# Patient Record
Sex: FEMALE | Race: WHITE | Employment: PART TIME | URBAN - METROPOLITAN AREA
[De-identification: names, ages, dates, MRNs, and addresses within clinical notes are randomized per-mention and may not be internally consistent; named-entity substitution may affect disease eponyms.]

---

## 2020-01-13 ENCOUNTER — HOSPITAL ENCOUNTER (INPATIENT)
Age: 40
LOS: 1 days | Discharge: PSYCHIATRIC OR SUBSTANCE USE FACILITY | DRG: 917 | End: 2020-01-15
Attending: EMERGENCY MEDICINE | Admitting: INTERNAL MEDICINE

## 2020-01-13 LAB
A/G RATIO: 2.1 (ref 1.1–2.2)
ACETAMINOPHEN LEVEL: <5 UG/ML (ref 10–30)
ALBUMIN SERPL-MCNC: 4.8 G/DL (ref 3.4–5)
ALP BLD-CCNC: 53 U/L (ref 40–129)
ALT SERPL-CCNC: 10 U/L (ref 10–40)
ANION GAP SERPL CALCULATED.3IONS-SCNC: 13 MMOL/L (ref 3–16)
AST SERPL-CCNC: 15 U/L (ref 15–37)
BASOPHILS ABSOLUTE: 0 K/UL (ref 0–0.2)
BASOPHILS RELATIVE PERCENT: 0.6 %
BILIRUB SERPL-MCNC: 0.3 MG/DL (ref 0–1)
BUN BLDV-MCNC: 10 MG/DL (ref 7–20)
CALCIUM SERPL-MCNC: 9.4 MG/DL (ref 8.3–10.6)
CHLORIDE BLD-SCNC: 102 MMOL/L (ref 99–110)
CO2: 24 MMOL/L (ref 21–32)
CREAT SERPL-MCNC: 0.6 MG/DL (ref 0.6–1.1)
EOSINOPHILS ABSOLUTE: 0 K/UL (ref 0–0.6)
EOSINOPHILS RELATIVE PERCENT: 0.8 %
ETHANOL: NORMAL MG/DL (ref 0–0.08)
GFR AFRICAN AMERICAN: >60
GFR NON-AFRICAN AMERICAN: >60
GLOBULIN: 2.3 G/DL
GLUCOSE BLD-MCNC: 123 MG/DL (ref 70–99)
HCT VFR BLD CALC: 42.7 % (ref 36–48)
HEMOGLOBIN: 14.3 G/DL (ref 12–16)
LYMPHOCYTES ABSOLUTE: 1.7 K/UL (ref 1–5.1)
LYMPHOCYTES RELATIVE PERCENT: 29 %
MAGNESIUM: 2.3 MG/DL (ref 1.8–2.4)
MCH RBC QN AUTO: 32.8 PG (ref 26–34)
MCHC RBC AUTO-ENTMCNC: 33.6 G/DL (ref 31–36)
MCV RBC AUTO: 97.5 FL (ref 80–100)
MONOCYTES ABSOLUTE: 0.5 K/UL (ref 0–1.3)
MONOCYTES RELATIVE PERCENT: 8.1 %
NEUTROPHILS ABSOLUTE: 3.6 K/UL (ref 1.7–7.7)
NEUTROPHILS RELATIVE PERCENT: 61.5 %
PDW BLD-RTO: 13.5 % (ref 12.4–15.4)
PLATELET # BLD: 185 K/UL (ref 135–450)
PMV BLD AUTO: 7.6 FL (ref 5–10.5)
POTASSIUM REFLEX MAGNESIUM: 3.2 MMOL/L (ref 3.5–5.1)
RBC # BLD: 4.38 M/UL (ref 4–5.2)
SALICYLATE, SERUM: <0.3 MG/DL (ref 15–30)
SODIUM BLD-SCNC: 139 MMOL/L (ref 136–145)
TOTAL PROTEIN: 7.1 G/DL (ref 6.4–8.2)
WBC # BLD: 5.8 K/UL (ref 4–11)

## 2020-01-13 PROCEDURE — 93005 ELECTROCARDIOGRAM TRACING: CPT | Performed by: EMERGENCY MEDICINE

## 2020-01-13 PROCEDURE — 80307 DRUG TEST PRSMV CHEM ANLYZR: CPT

## 2020-01-13 PROCEDURE — 81003 URINALYSIS AUTO W/O SCOPE: CPT

## 2020-01-13 PROCEDURE — 83735 ASSAY OF MAGNESIUM: CPT

## 2020-01-13 PROCEDURE — 84703 CHORIONIC GONADOTROPIN ASSAY: CPT

## 2020-01-13 PROCEDURE — 2580000003 HC RX 258: Performed by: EMERGENCY MEDICINE

## 2020-01-13 PROCEDURE — 85025 COMPLETE CBC W/AUTO DIFF WBC: CPT

## 2020-01-13 PROCEDURE — P9612 CATHETERIZE FOR URINE SPEC: HCPCS

## 2020-01-13 PROCEDURE — G0480 DRUG TEST DEF 1-7 CLASSES: HCPCS

## 2020-01-13 PROCEDURE — 80053 COMPREHEN METABOLIC PANEL: CPT

## 2020-01-13 PROCEDURE — 99285 EMERGENCY DEPT VISIT HI MDM: CPT

## 2020-01-13 RX ORDER — 0.9 % SODIUM CHLORIDE 0.9 %
1000 INTRAVENOUS SOLUTION INTRAVENOUS ONCE
Status: COMPLETED | OUTPATIENT
Start: 2020-01-13 | End: 2020-01-13

## 2020-01-13 RX ADMIN — SODIUM CHLORIDE 1000 ML: 9 INJECTION, SOLUTION INTRAVENOUS at 21:45

## 2020-01-14 PROBLEM — T45.0X2A INTENTIONAL DIPHENHYDRAMINE OVERDOSE (HCC): Status: ACTIVE | Noted: 2020-01-14

## 2020-01-14 PROBLEM — T14.91XA SUICIDE ATTEMPT (HCC): Status: ACTIVE | Noted: 2020-01-14

## 2020-01-14 LAB
AMPHETAMINE SCREEN, URINE: ABNORMAL
ANION GAP SERPL CALCULATED.3IONS-SCNC: 11 MMOL/L (ref 3–16)
BARBITURATE SCREEN URINE: ABNORMAL
BENZODIAZEPINE SCREEN, URINE: ABNORMAL
BILIRUBIN URINE: NEGATIVE
BLOOD, URINE: NEGATIVE
BUN BLDV-MCNC: 5 MG/DL (ref 7–20)
CALCIUM SERPL-MCNC: 9.2 MG/DL (ref 8.3–10.6)
CANNABINOID SCREEN URINE: POSITIVE
CHLORIDE BLD-SCNC: 104 MMOL/L (ref 99–110)
CLARITY: CLEAR
CO2: 23 MMOL/L (ref 21–32)
COCAINE METABOLITE SCREEN URINE: ABNORMAL
COLOR: YELLOW
CREAT SERPL-MCNC: <0.5 MG/DL (ref 0.6–1.1)
EKG ATRIAL RATE: 101 BPM
EKG ATRIAL RATE: 114 BPM
EKG ATRIAL RATE: 66 BPM
EKG ATRIAL RATE: 69 BPM
EKG ATRIAL RATE: 71 BPM
EKG DIAGNOSIS: NORMAL
EKG P AXIS: 66 DEGREES
EKG P AXIS: 74 DEGREES
EKG P AXIS: 75 DEGREES
EKG P AXIS: 79 DEGREES
EKG P AXIS: 79 DEGREES
EKG P-R INTERVAL: 144 MS
EKG P-R INTERVAL: 146 MS
EKG P-R INTERVAL: 146 MS
EKG P-R INTERVAL: 150 MS
EKG P-R INTERVAL: 152 MS
EKG Q-T INTERVAL: 352 MS
EKG Q-T INTERVAL: 376 MS
EKG Q-T INTERVAL: 456 MS
EKG Q-T INTERVAL: 468 MS
EKG Q-T INTERVAL: 474 MS
EKG QRS DURATION: 92 MS
EKG QRS DURATION: 94 MS
EKG QRS DURATION: 94 MS
EKG QTC CALCULATION (BAZETT): 485 MS
EKG QTC CALCULATION (BAZETT): 487 MS
EKG QTC CALCULATION (BAZETT): 495 MS
EKG QTC CALCULATION (BAZETT): 496 MS
EKG QTC CALCULATION (BAZETT): 501 MS
EKG R AXIS: 77 DEGREES
EKG R AXIS: 80 DEGREES
EKG R AXIS: 84 DEGREES
EKG R AXIS: 85 DEGREES
EKG R AXIS: 90 DEGREES
EKG T AXIS: 52 DEGREES
EKG T AXIS: 68 DEGREES
EKG T AXIS: 69 DEGREES
EKG T AXIS: 74 DEGREES
EKG T AXIS: 77 DEGREES
EKG VENTRICULAR RATE: 101 BPM
EKG VENTRICULAR RATE: 114 BPM
EKG VENTRICULAR RATE: 66 BPM
EKG VENTRICULAR RATE: 69 BPM
EKG VENTRICULAR RATE: 71 BPM
GFR AFRICAN AMERICAN: >60
GFR NON-AFRICAN AMERICAN: >60
GLUCOSE BLD-MCNC: 94 MG/DL (ref 70–99)
GLUCOSE URINE: NEGATIVE MG/DL
HCG(URINE) PREGNANCY TEST: NEGATIVE
HCT VFR BLD CALC: 42.7 % (ref 36–48)
HEMOGLOBIN: 14.3 G/DL (ref 12–16)
KETONES, URINE: NEGATIVE MG/DL
LEUKOCYTE ESTERASE, URINE: NEGATIVE
Lab: ABNORMAL
MAGNESIUM: 2.3 MG/DL (ref 1.8–2.4)
MCH RBC QN AUTO: 32.6 PG (ref 26–34)
MCHC RBC AUTO-ENTMCNC: 33.6 G/DL (ref 31–36)
MCV RBC AUTO: 97.1 FL (ref 80–100)
METHADONE SCREEN, URINE: ABNORMAL
MICROSCOPIC EXAMINATION: NORMAL
NITRITE, URINE: NEGATIVE
OPIATE SCREEN URINE: ABNORMAL
OXYCODONE URINE: ABNORMAL
PDW BLD-RTO: 13.4 % (ref 12.4–15.4)
PH UA: 7.5
PH UA: 7.5 (ref 5–8)
PHENCYCLIDINE SCREEN URINE: ABNORMAL
PLATELET # BLD: 186 K/UL (ref 135–450)
PMV BLD AUTO: 7.5 FL (ref 5–10.5)
POTASSIUM SERPL-SCNC: 4 MMOL/L (ref 3.5–5.1)
PROPOXYPHENE SCREEN: ABNORMAL
PROTEIN UA: NEGATIVE MG/DL
RBC # BLD: 4.39 M/UL (ref 4–5.2)
SODIUM BLD-SCNC: 138 MMOL/L (ref 136–145)
SPECIFIC GRAVITY UA: 1.01 (ref 1–1.03)
URINE REFLEX TO CULTURE: NORMAL
URINE TYPE: NORMAL
UROBILINOGEN, URINE: 0.2 E.U./DL
WBC # BLD: 7.3 K/UL (ref 4–11)

## 2020-01-14 PROCEDURE — 93005 ELECTROCARDIOGRAM TRACING: CPT | Performed by: INTERNAL MEDICINE

## 2020-01-14 PROCEDURE — 85027 COMPLETE CBC AUTOMATED: CPT

## 2020-01-14 PROCEDURE — 6360000002 HC RX W HCPCS: Performed by: EMERGENCY MEDICINE

## 2020-01-14 PROCEDURE — 93010 ELECTROCARDIOGRAM REPORT: CPT | Performed by: INTERNAL MEDICINE

## 2020-01-14 PROCEDURE — 2580000003 HC RX 258: Performed by: INTERNAL MEDICINE

## 2020-01-14 PROCEDURE — 80048 BASIC METABOLIC PNL TOTAL CA: CPT

## 2020-01-14 PROCEDURE — 36415 COLL VENOUS BLD VENIPUNCTURE: CPT

## 2020-01-14 PROCEDURE — 2060000000 HC ICU INTERMEDIATE R&B

## 2020-01-14 PROCEDURE — 6360000002 HC RX W HCPCS: Performed by: INTERNAL MEDICINE

## 2020-01-14 RX ORDER — MAGNESIUM SULFATE 1 G/100ML
1 INJECTION INTRAVENOUS PRN
Status: DISCONTINUED | OUTPATIENT
Start: 2020-01-14 | End: 2020-01-15 | Stop reason: HOSPADM

## 2020-01-14 RX ORDER — CYCLOBENZAPRINE HCL 10 MG
10 TABLET ORAL PRN
Status: ON HOLD | COMMUNITY
End: 2020-01-17 | Stop reason: HOSPADM

## 2020-01-14 RX ORDER — METHOCARBAMOL 750 MG/1
750 TABLET, FILM COATED ORAL DAILY PRN
Status: ON HOLD | COMMUNITY
End: 2020-01-17 | Stop reason: HOSPADM

## 2020-01-14 RX ORDER — BUSPIRONE HYDROCHLORIDE 10 MG/1
10 TABLET ORAL 2 TIMES DAILY
Status: ON HOLD | COMMUNITY
End: 2020-01-17 | Stop reason: HOSPADM

## 2020-01-14 RX ORDER — SODIUM CHLORIDE 0.9 % (FLUSH) 0.9 %
10 SYRINGE (ML) INJECTION EVERY 12 HOURS SCHEDULED
Status: DISCONTINUED | OUTPATIENT
Start: 2020-01-14 | End: 2020-01-15 | Stop reason: HOSPADM

## 2020-01-14 RX ORDER — POTASSIUM CHLORIDE 20 MEQ/1
40 TABLET, EXTENDED RELEASE ORAL ONCE
Status: DISCONTINUED | OUTPATIENT
Start: 2020-01-14 | End: 2020-01-14

## 2020-01-14 RX ORDER — MONTELUKAST SODIUM 10 MG/1
10 TABLET ORAL NIGHTLY
Status: ON HOLD | COMMUNITY
End: 2020-01-17 | Stop reason: HOSPADM

## 2020-01-14 RX ORDER — ACETAMINOPHEN,DIPHENHYDRAMINE HCL 500; 25 MG/1; MG/1
1 TABLET, FILM COATED ORAL NIGHTLY PRN
Status: ON HOLD | COMMUNITY
End: 2020-01-17 | Stop reason: HOSPADM

## 2020-01-14 RX ORDER — SODIUM CHLORIDE 0.9 % (FLUSH) 0.9 %
10 SYRINGE (ML) INJECTION PRN
Status: DISCONTINUED | OUTPATIENT
Start: 2020-01-14 | End: 2020-01-15 | Stop reason: HOSPADM

## 2020-01-14 RX ORDER — POTASSIUM CHLORIDE 7.45 MG/ML
10 INJECTION INTRAVENOUS PRN
Status: DISCONTINUED | OUTPATIENT
Start: 2020-01-14 | End: 2020-01-15 | Stop reason: HOSPADM

## 2020-01-14 RX ORDER — OMEPRAZOLE 20 MG/1
40 CAPSULE, DELAYED RELEASE ORAL DAILY
COMMUNITY

## 2020-01-14 RX ORDER — FLUTICASONE PROPIONATE 50 MCG
1 SPRAY, SUSPENSION (ML) NASAL DAILY
Status: ON HOLD | COMMUNITY
End: 2020-01-17 | Stop reason: HOSPADM

## 2020-01-14 RX ORDER — POTASSIUM CHLORIDE 7.45 MG/ML
10 INJECTION INTRAVENOUS ONCE
Status: COMPLETED | OUTPATIENT
Start: 2020-01-14 | End: 2020-01-14

## 2020-01-14 RX ORDER — SODIUM CHLORIDE, SODIUM LACTATE, POTASSIUM CHLORIDE, CALCIUM CHLORIDE 600; 310; 30; 20 MG/100ML; MG/100ML; MG/100ML; MG/100ML
INJECTION, SOLUTION INTRAVENOUS CONTINUOUS
Status: DISCONTINUED | OUTPATIENT
Start: 2020-01-14 | End: 2020-01-15 | Stop reason: HOSPADM

## 2020-01-14 RX ORDER — ONDANSETRON 2 MG/ML
4 INJECTION INTRAMUSCULAR; INTRAVENOUS EVERY 6 HOURS PRN
Status: DISCONTINUED | OUTPATIENT
Start: 2020-01-14 | End: 2020-01-15 | Stop reason: HOSPADM

## 2020-01-14 RX ORDER — CETIRIZINE HYDROCHLORIDE 10 MG/1
10 TABLET ORAL DAILY
Status: ON HOLD | COMMUNITY
End: 2020-01-17 | Stop reason: HOSPADM

## 2020-01-14 RX ORDER — LORAZEPAM 2 MG/ML
2 INJECTION INTRAMUSCULAR EVERY 4 HOURS PRN
Status: DISCONTINUED | OUTPATIENT
Start: 2020-01-14 | End: 2020-01-15 | Stop reason: HOSPADM

## 2020-01-14 RX ORDER — SULFAMETHOXAZOLE AND TRIMETHOPRIM 800; 160 MG/1; MG/1
1 TABLET ORAL 2 TIMES DAILY
Status: ON HOLD | COMMUNITY
End: 2020-01-17 | Stop reason: HOSPADM

## 2020-01-14 RX ADMIN — SODIUM CHLORIDE, POTASSIUM CHLORIDE, SODIUM LACTATE AND CALCIUM CHLORIDE: 600; 310; 30; 20 INJECTION, SOLUTION INTRAVENOUS at 10:18

## 2020-01-14 RX ADMIN — ENOXAPARIN SODIUM 40 MG: 40 INJECTION SUBCUTANEOUS at 10:10

## 2020-01-14 RX ADMIN — SODIUM CHLORIDE, POTASSIUM CHLORIDE, SODIUM LACTATE AND CALCIUM CHLORIDE: 600; 310; 30; 20 INJECTION, SOLUTION INTRAVENOUS at 03:09

## 2020-01-14 RX ADMIN — SODIUM CHLORIDE, POTASSIUM CHLORIDE, SODIUM LACTATE AND CALCIUM CHLORIDE: 600; 310; 30; 20 INJECTION, SOLUTION INTRAVENOUS at 18:02

## 2020-01-14 RX ADMIN — POTASSIUM CHLORIDE 10 MEQ: 7.46 INJECTION, SOLUTION INTRAVENOUS at 01:01

## 2020-01-14 RX ADMIN — LORAZEPAM 2 MG: 2 INJECTION INTRAMUSCULAR; INTRAVENOUS at 11:58

## 2020-01-14 RX ADMIN — LORAZEPAM 2 MG: 2 INJECTION INTRAMUSCULAR; INTRAVENOUS at 07:03

## 2020-01-14 RX ADMIN — LORAZEPAM 2 MG: 2 INJECTION INTRAMUSCULAR; INTRAVENOUS at 03:09

## 2020-01-14 ASSESSMENT — PAIN SCALES - GENERAL
PAINLEVEL_OUTOF10: 0

## 2020-01-14 NOTE — PROGRESS NOTES
Patient anxious in bed, trying to get up, yelling out. PRN ativan given per order. Sitter at bedside,will continue to monitor.

## 2020-01-14 NOTE — PROGRESS NOTES
Patient has remained awake. Patient is picking at air , and is restless in bed. Patient offered food and drink many times however is unable to verbalize or use straw at this time. Will continue to monitor for safety.

## 2020-01-14 NOTE — H&P
[address]                 [address] [address]                              HISTORY AND PHYSICAL    PATIENT NAME: Brittney Henderson                       :        1980  MED REC NO:   [id number]                          ROOM:         ACCOUNT NO:   [de-identified]                           ADMIT DATE: 2020  PROVIDER:     Cl Moreno MD    DATE OF SERVICE:  I obtained the history and performed physical exam on  the patient on the medical floor in the presence of her sitter in the  room on 2020. CHIEF COMPLAINT:  Suicide attempt with Benadryl overdose. HISTORY OF PRESENT ILLNESS:  The patient is a 49-year-old   female who has appeared extremely jittery, incoherent and anxious,  apparently tried to commit suicide with unknown number of  diphenhydramine over the counter tablets after becoming upset with  respect to her boyfriend breaking up with her. The patient is very  incoherent, jittery, anxious, but not showing any obvious seizures or  any obvious obtundation. PHYSICAL EXAMINATION:  PSYCH:  The patient is very jittery, anxious, incoherent and is not able  to carry out a proper communication. EYES:  Pupils are bilaterally equal, dilated. ENT:  No oral mucosal lesions. The oral mucosa appears dry. RESPIRATORY SYSTEM:  No rales or rhonchi. CVS:  S1, S2 are heard, tachycardic rhythm. ABDOMEN:  Soft. No guarding, rigidity, or rebound. MUSCULOSKELETAL:  No acute musculoskeletal deformities. SKIN:  Without rashes or lesions. DIAGNOSTIC DATA:  Urine drug screen is positive for cannabis. Pregnancy  negative. UA negative for infection. EKG independently reviewed by me  shows sinus tachycardia with a rate of 101 beats per minute. Salicylate  less than 0.3. Magnesium 2.30. BUN 10, creatinine 0.6, sodium 139,  potassium 3.2. Alcohol not detected. CBC showed white count of 5.8.     CONSULTATIONS:

## 2020-01-14 NOTE — PLAN OF CARE
Problem: Falls - Risk of:  Goal: Will remain free from falls  Description  Will remain free from falls  Outcome: Ongoing  Note:   Patient unsteady on feet  Bed alarm on,sitter in room     Problem: Confusion - Acute:  Goal: Mental status will be restored to baseline  Description  Mental status will be restored to baseline  Outcome: Ongoing     Problem: Injury - Risk of, Physical Injury:  Goal: Will remain free from falls  Description  Will remain free from falls  Outcome: Ongoing  Note:   Patient unsteady on feet  Bed alarm on,sitter in room     Problem: Mood - Altered:  Goal: Mood stable  Description  Mood stable  Outcome: Ongoing

## 2020-01-14 NOTE — PROGRESS NOTES
Patient sleeping currently, no restless movements at this time. Patient started sleeping at approximately 1300. Continuing 1:1 monitoring for safety.

## 2020-01-14 NOTE — PROGRESS NOTES
Psychiatry consult called to Dr. John Resendiz on call. Spoke with Jack @1010.     Daniel SUE/MT  01/14/2020

## 2020-01-14 NOTE — PROGRESS NOTES
Shift assessment complete, morning medications given, patient resting with no complaints of pain,patient's language is garbled, will continue to monitor. Kassidy Haji RN.

## 2020-01-14 NOTE — ED PROVIDER NOTES
I received this patient in signout from Dr. Saúl Cline. We discussed the patient's initial history, physical, workup thus far, and medical decision making to this point. Briefly, German Waller is a 37 y.o. female  who presents to the ED complaining of benadryl OD. Pending studies at time of signout include: discussion with poison control and dr. Gerard Hilliard hospitalist.     The patient will be reassessed after workup above is completed. Anticipated disposition at time of signout is unknown      ED COURSE/MDM  I introduced myself to the patient and performed my initial assessment on German Waller. There is no significant change in the patient's history from what is documented initially by Dr. Saúl Cline  after my evaluation. Old records reviewed. Labs and imaging reviewed and results discussed with patient. Since time of sign out, the patient's ED workup was notable for a discussion with hospitalist, dr. Gerard Hilliard - requested we speak with poison control to see recommended observation period. Pt appears to still be symptomatic - spoke with jillian at [facility] control - recommended at least 12 hr observation. Said that she is likely to be symptomatic into the morning hours. Recommended checking another EKG before clearing here. Also recommended adding on Magnesium. This was pending at the time of this note. Pt admitted to the hospital - dr. Gerard Hilliard accepted. During the patient's ED course, the patient was given:  Medications   potassium chloride (KLOR-CON M) extended release tablet 40 mEq (has no administration in time range)   0.9 % sodium chloride bolus (0 mLs Intravenous Stopped 1/13/20 2300)        CLINICAL IMPRESSION  1. Antihistamines overdose, intentional self-harm, initial encounter ([facility]    2. Suicide attempt by drug overdose ([facility]        Blood pressure 135/86, pulse 98, temperature 97.4 °F (36.3 °C), temperature source Axillary, resp. rate 22, SpO2 98 %.     DISPOSITION  German Waller was admitted in stable condition. Patient was given scripts for the following medications. I counseled patient how to take these medications. New Prescriptions    No medications on file       This chart was created using Dragon dictation software. Efforts were made by me to ensure accuracy, however some errors may be present due to limitations of this technology.         Nat Monday, DO 01/14/20 8971

## 2020-01-14 NOTE — ED PROVIDER NOTES
SOCIAL HISTORY       Social History     Socioeconomic History    Marital status: Legally      Spouse name: None    Number of children: None    Years of education: None    Highest education level: None   Occupational History    None   Social Needs    Financial resource strain: None    Food insecurity:     Worry: None     Inability: None    Transportation needs:     Medical: None     Non-medical: None   Tobacco Use    Smoking status: Current Every Day Smoker     Types: Cigarettes   Substance and Sexual Activity    Alcohol use: Yes    Drug use: Yes     Types: Marijuana    Sexual activity: Yes   Lifestyle    Physical activity:     Days per week: None     Minutes per session: None    Stress: None   Relationships    Social connections:     Talks on phone: None     Gets together: None     Attends Faith service: None     Active member of club or organization: None     Attends meetings of clubs or organizations: None     Relationship status: None    Intimate partner violence:     Fear of current or ex partner: None     Emotionally abused: None     Physically abused: None     Forced sexual activity: None   Other Topics Concern    None   Social History Narrative    None       SCREENINGS      @FLOW([id number])@      PHYSICAL EXAM    (up to 7 for level 4, 8 or more for level 5)     ED Triage Vitals [01/13/20 2110]   BP Temp Temp Source Pulse Resp SpO2 Height Weight   (!) 149/92 97.4 °F (36.3 °C) Axillary 120 22 98 % -- --       Physical Exam  Vitals signs and nursing note reviewed. Constitutional:       General: She is not in acute distress. Appearance: Normal appearance. She is not ill-appearing or diaphoretic. HENT:      Head: Normocephalic and atraumatic. Nose: Nose normal.   Eyes:      Extraocular Movements: Extraocular movements intact. Pupils: Pupils are equal, round, and reactive to light. Neck:      Musculoskeletal: Normal range of motion and neck supple.  No neck rigidity. Cardiovascular:      Rate and Rhythm: Regular rhythm. Tachycardia present. Pulses: Normal pulses. Heart sounds: Normal heart sounds. No murmur. No friction rub. No gallop. Pulmonary:      Effort: Pulmonary effort is normal. No respiratory distress. Breath sounds: Normal breath sounds. No stridor. No wheezing or rales. Abdominal:      General: Abdomen is flat. There is no distension. Palpations: Abdomen is soft. Tenderness: There is no tenderness. There is no guarding. Musculoskeletal: Normal range of motion. General: No deformity. Skin:     General: Skin is warm and dry. Capillary Refill: Capillary refill takes less than 2 seconds. Neurological:      General: No focal deficit present. Mental Status: She is alert. She is disoriented. Coordination: Coordination abnormal.   Psychiatric:         Attention and Perception: She is inattentive. Speech: She is noncommunicative. Speech is slurred. Behavior: Behavior is slowed. Behavior is not combative. Thought Content: Thought content includes suicidal ideation. Cognition and Memory: Cognition is impaired. Judgment: Judgment is impulsive.          DIAGNOSTIC RESULTS   LABS:    Labs Reviewed   COMPREHENSIVE METABOLIC PANEL W/ REFLEX TO MG FOR LOW K - Abnormal; Notable for the following components:       Result Value    Potassium reflex Magnesium 3.2 (*)     Glucose 123 (*)     All other components within normal limits    Narrative:     Performed at:  [facility]) - [address] [address],  [place], [facility]   Phone [phone]   ACETAMINOPHEN LEVEL - Abnormal; Notable for the following components:    Acetaminophen Level <5 (*)     All other components within normal limits    Narrative:     Performed at:  [address] [address],  [place], [facility]   Phone [phone]   SALICYLATE LEVEL - Abnormal; Notable for the following components:    Salicylate, Serum <7.1 (*)     All other components within normal limits    Narrative:     Performed at:  [facility],  [place], [facility]   Phone [phone]   CBC WITH AUTO DIFFERENTIAL    Narrative:     Performed at:  [facility],  [place], [facility]   Phone [phone]   URINE DRUG SCREEN    Narrative:     Performed at:  [facility],  [place], [facility]   Phone [phone]   URINE RT REFLEX TO CULTURE    Narrative:     Performed at:  [address] [address],  [place], [facility]   Phone [phone]   PREGNANCY, URINE    Narrative:     Performed at:  [facility],  [place], [facility]   Phone [phone]   ETHANOL    Narrative:     Performed at:  [address] [address],  [place], [facility]   Phone [phone]   MAGNESIUM    Narrative:     Performed at:  [facility],  [place], [facility]   Phone [phone]       All other labs were within normal range or not returned as of thisdictation. EKG: All EKG's are interpreted by the Emergency Department Physician who either signs or Co-signs this chart in the absence of a cardiologist.    EKG per my interpretation demonstrates sinus tachycardia 114 bpm.  Normal axis, normal intervals. No acute ST elevations or T wave inversions. Nonspecific ST and T wave abnormality present. No prior EKGs available for comparison.     RADIOLOGY:   Non-plain film images such as CT, Ultrasound and MRI are read by the radiologist. Plainradiographic images are visualized and preliminarily interpreted by the  ED Provider with the belowfindings:    Interpretation per the Radiologist DISCONTINUED MEDICATIONS:  Discontinued Medications    No medications on file              (Please note that portions of this note were completed with a voice recognition program.  Efforts were made to edit the dictations but occasionally words aremis-transcribed.)    Jb Ruelas MD (electronically signed)            Jb Ruelas MD  01/14/20 5544       Jb Ruelas MD  01/14/20 7436

## 2020-01-14 NOTE — PROGRESS NOTES
Ativan given for increased agitation and to relax patient for comfort. Patient able to follow simple commands at this time.  Erica Schultz RN

## 2020-01-14 NOTE — ED NOTES
Spoke with Yosef Aldana at [facility]. Yosef Aldana suggested 6-8 hour observation, benzo's of agitation, labs, Anticholergic effects and seizure precautions.      Fitz Arias RN  01/14/20 0010

## 2020-01-15 ENCOUNTER — HOSPITAL ENCOUNTER (INPATIENT)
Age: 40
LOS: 2 days | Discharge: HOME | DRG: 881 | End: 2020-01-17
Attending: PSYCHIATRY & NEUROLOGY | Admitting: PSYCHIATRY & NEUROLOGY

## 2020-01-15 VITALS
OXYGEN SATURATION: 98 % | DIASTOLIC BLOOD PRESSURE: 75 MMHG | RESPIRATION RATE: 18 BRPM | HEART RATE: 73 BPM | WEIGHT: 159.3 LBS | TEMPERATURE: 98.1 F | SYSTOLIC BLOOD PRESSURE: 128 MMHG

## 2020-01-15 PROBLEM — F39 MOOD DISORDER (HCC): Status: ACTIVE | Noted: 2020-01-15

## 2020-01-15 PROBLEM — T50.902A SUICIDE ATTEMPT BY DRUG OVERDOSE (HCC): Status: ACTIVE | Noted: 2020-01-14

## 2020-01-15 PROBLEM — F41.0 PANIC DISORDER WITHOUT AGORAPHOBIA: Status: ACTIVE | Noted: 2020-01-15

## 2020-01-15 PROCEDURE — 99255 IP/OBS CONSLTJ NEW/EST HI 80: CPT | Performed by: PSYCHIATRY & NEUROLOGY

## 2020-01-15 PROCEDURE — 1240000000 HC EMOTIONAL WELLNESS R&B

## 2020-01-15 PROCEDURE — 2580000003 HC RX 258: Performed by: INTERNAL MEDICINE

## 2020-01-15 PROCEDURE — 6370000000 HC RX 637 (ALT 250 FOR IP): Performed by: PHYSICIAN ASSISTANT

## 2020-01-15 PROCEDURE — 99238 HOSP IP/OBS DSCHRG MGMT 30/<: CPT | Performed by: PHYSICIAN ASSISTANT

## 2020-01-15 RX ORDER — FLUTICASONE PROPIONATE 50 MCG
1 SPRAY, SUSPENSION (ML) NASAL DAILY
Status: CANCELLED | OUTPATIENT
Start: 2020-01-15

## 2020-01-15 RX ORDER — FLUTICASONE PROPIONATE 50 MCG
1 SPRAY, SUSPENSION (ML) NASAL DAILY
Status: DISCONTINUED | OUTPATIENT
Start: 2020-01-16 | End: 2020-01-17 | Stop reason: HOSPADM

## 2020-01-15 RX ORDER — OXYMETAZOLINE HYDROCHLORIDE 0.05 G/100ML
2 SPRAY NASAL 2 TIMES DAILY PRN
Status: CANCELLED | OUTPATIENT
Start: 2020-01-15 | End: 2020-01-18

## 2020-01-15 RX ORDER — MONTELUKAST SODIUM 10 MG/1
10 TABLET ORAL NIGHTLY
Status: DISCONTINUED | OUTPATIENT
Start: 2020-01-15 | End: 2020-01-17 | Stop reason: HOSPADM

## 2020-01-15 RX ORDER — PANTOPRAZOLE SODIUM 40 MG/1
40 TABLET, DELAYED RELEASE ORAL
Status: CANCELLED | OUTPATIENT
Start: 2020-01-16

## 2020-01-15 RX ORDER — OXYMETAZOLINE HYDROCHLORIDE 0.05 G/100ML
2 SPRAY NASAL 2 TIMES DAILY PRN
Status: DISCONTINUED | OUTPATIENT
Start: 2020-01-15 | End: 2020-01-17 | Stop reason: HOSPADM

## 2020-01-15 RX ORDER — PANTOPRAZOLE SODIUM 40 MG/1
40 TABLET, DELAYED RELEASE ORAL
Status: DISCONTINUED | OUTPATIENT
Start: 2020-01-16 | End: 2020-01-17 | Stop reason: HOSPADM

## 2020-01-15 RX ORDER — NICOTINE 21 MG/24HR
1 PATCH, TRANSDERMAL 24 HOURS TRANSDERMAL DAILY
Status: DISCONTINUED | OUTPATIENT
Start: 2020-01-15 | End: 2020-01-17 | Stop reason: HOSPADM

## 2020-01-15 RX ORDER — MONTELUKAST SODIUM 10 MG/1
10 TABLET ORAL NIGHTLY
Status: CANCELLED | OUTPATIENT
Start: 2020-01-15

## 2020-01-15 RX ADMIN — MONTELUKAST SODIUM 10 MG: 10 TABLET, COATED ORAL at 21:26

## 2020-01-15 RX ADMIN — Medication 10 ML: at 09:06

## 2020-01-15 RX ADMIN — SODIUM CHLORIDE, POTASSIUM CHLORIDE, SODIUM LACTATE AND CALCIUM CHLORIDE: 600; 310; 30; 20 INJECTION, SOLUTION INTRAVENOUS at 09:06

## 2020-01-15 RX ADMIN — SODIUM CHLORIDE, POTASSIUM CHLORIDE, SODIUM LACTATE AND CALCIUM CHLORIDE: 600; 310; 30; 20 INJECTION, SOLUTION INTRAVENOUS at 00:46

## 2020-01-15 ASSESSMENT — SLEEP AND FATIGUE QUESTIONNAIRES
AVERAGE NUMBER OF SLEEP HOURS: 4
DO YOU HAVE DIFFICULTY SLEEPING: NO
DO YOU USE A SLEEP AID: NO

## 2020-01-15 ASSESSMENT — LIFESTYLE VARIABLES: HISTORY_ALCOHOL_USE: NO

## 2020-01-15 ASSESSMENT — PAIN SCALES - GENERAL: PAINLEVEL_OUTOF10: 0

## 2020-01-15 NOTE — DISCHARGE SUMMARY
Name:  Kushal Johns  Room:  /0228-74  MRN:    [id number]    Discharge Summary      This discharge summary is in conjunction with a complete physical exam done on the day of discharge. Discharging Physician: Dr. Eduardo Burton: 1/13/2020  Discharge:   1/15/2020    HPI taken from admission H&P:    The patient is a 78-year-old   female who has appeared extremely jittery, incoherent and anxious,  apparently tried to commit suicide with unknown number of  diphenhydramine over the counter tablets after becoming upset with  respect to her boyfriend breaking up with her. The patient is very  incoherent, jittery, anxious, but not showing any obvious seizures or  any obvious obtundation. Diagnoses this Admission and Hospital Course   Intentional Benadryl overdose  Suicidal ideations  -Patient was initially very drowsy and difficult to communicate with but now significantly improved  -Admitted to PCU with telemetry monitoring  -Also monitor QTC--continue to increase and has since remained stable around 500  -Patient was on seizure precautions with PRN benzodiazepines as needed, no seizure activity recorded  -Sitter placed at bedside, safety tray  -Psychiatry consult: Recommends inpatient psychiatric admission once medically stable    Acute toxic encephalopathy  -Secondary to Benadryl overdose  -Mentation has returned to normal    Procedures (Please Review Full Report for Details)  None    Consults    Psychiatry    Physical Exam at Discharge:    /75   Pulse 73   Temp 98.1 °F (36.7 °C) (Oral)   Resp 18   Wt 159 lb 4.8 oz (72.3 kg)   SpO2 98%   Breastfeeding? No   Gen: No distress. Alert. Eyes: PERRL. No sclera icterus. No conjunctival injection. ENT: No discharge. Pharynx clear. Neck: No JVD. Trachea midline. Resp: No accessory muscle use. No crackles. No wheezes. No rhonchi. CV: Regular rate. Regular rhythm. No murmur. No rub. No edema.    Capillary Refill: Brisk,< 3 seconds Peripheral Pulses: +2 palpable, equal bilaterally   GI: Non-tender. Non-distended. Normal bowel sounds. Skin: Warm and dry. No nodule on exposed extremities. No rash on exposed extremities. M/S: No cyanosis. No joint deformity. No clubbing. Neuro: Awake. Grossly nonfocal    Psych: Oriented x 3. No anxiety or agitation.        CBC:   Recent Labs     01/13/20 2124 01/14/20  0928   WBC 5.8 7.3   HGB 14.3 14.3   HCT 42.7 42.7   MCV 97.5 97.1    186     BMP:   Recent Labs     01/13/20 2124 01/14/20 0928    138   K 3.2* 4.0    104   CO2 24 23   BUN 10 5*   CREATININE 0.6 <0.5*     LIVER PROFILE:   Recent Labs     01/13/20 2124   AST 15   ALT 10   BILITOT 0.3   ALKPHOS 53     UA:  Recent Labs     01/13/20  2334   COLORU Yellow   PHUR 7.5  7.5   CLARITYU Clear   SPECGRAV 1.015   LEUKOCYTESUR Negative   UROBILINOGEN 0.2   BILIRUBINUR Negative   BLOODU Negative   GLUCOSEU Negative     CULTURES  None    RADIOLOGY  No orders to display       Discharge Medications     Medication List      ASK your doctor about these medications    ACETAMINOPHEN PM  MG tablet  Generic drug:  diphenhydrAMINE-APAP (sleep)     busPIRone 10 MG tablet  Commonly known as:  BUSPAR     cetirizine 10 MG tablet  Commonly known as:  ZYRTEC     cyclobenzaprine 10 MG tablet  Commonly known as:  FLEXERIL     diclofenac sodium 1 % Gel     fluticasone 50 MCG/ACT nasal spray  Commonly known as:  FLONASE     methocarbamol 750 MG tablet  Commonly known as:  ROBAXIN     montelukast 10 MG tablet  Commonly known as:  SINGULAIR     omeprazole 20 MG delayed release capsule  Commonly known as:  PRILOSEC     PREMPRO 0.3-1.5 MG per tablet  Generic drug:  estrogen (conjugated)-medroxyprogesterone     sulfamethoxazole-trimethoprim 800-160 MG per tablet  Commonly known as:  BACTRIM DS;SEPTRA DS          Discharged in stable condition to inpatient psychiatry unit     Kimberly Holder PA-C  1/15/2020 2:18 PM

## 2020-01-15 NOTE — PROGRESS NOTES
Patient transferred to [facility] at this time via transport.  Will update patient's mother per her request.

## 2020-01-15 NOTE — PLAN OF CARE
frequency  Description  Decrease in sensory misperception frequency  Outcome: Completed  Goal: Able to refrain from responding to false sensory perceptions  Description  Able to refrain from responding to false sensory perceptions  Outcome: Completed  Goal: Demonstrates accurate environmental perceptions  Description  Demonstrates accurate environmental perceptions  Outcome: Completed  Goal: Able to distinguish between reality-based and nonreality-based thinking  Description  Able to distinguish between reality-based and nonreality-based thinking  Outcome: Completed  Goal: Able to interrupt nonreality-based thinking  Description  Able to interrupt nonreality-based thinking  Outcome: Completed     Problem: Sleep Pattern Disturbance:  Goal: Appears well-rested  Description  Appears well-rested  Outcome: Completed     Problem: Infection:  Goal: Will remain free from infection  Description  Will remain free from infection  Outcome: Completed     Problem: Safety:  Goal: Free from accidental physical injury  Description  Free from accidental physical injury  Outcome: Completed  Goal: Free from intentional harm  Description  Free from intentional harm  Outcome: Completed     Problem: Daily Care:  Goal: Daily care needs are met  Description  Daily care needs are met  Outcome: Completed     Problem: Pain:  Goal: Patient's pain/discomfort is manageable  Description  Patient's pain/discomfort is manageable  Outcome: Completed     Problem: Skin Integrity:  Goal: Skin integrity will stabilize  Description  Skin integrity will stabilize  Outcome: Completed     Problem: Discharge Planning:  Goal: Patients continuum of care needs are met  Description  Patients continuum of care needs are met  Outcome: Completed     Problem: Suicide risk  Goal: Provide patient with safe environment  Description  Provide patient with safe environment  Outcome: Completed     Problem: Coping:  Goal: Family's ability to cope with current situation will improve  Description  Family's ability to cope with current situation will improve  Outcome: Completed     Problem: Health Behavior:  Goal: Ability to identify and utilize available support systems will improve  Description  Ability to identify and utilize available support systems will improve  Outcome: Completed
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
## 2020-01-15 NOTE — PLAN OF CARE
Problem: Falls - Risk of:  Goal: Will remain free from falls  Description  Will remain free from falls  Outcome: Ongoing  Goal: Absence of physical injury  Description  Absence of physical injury  Outcome: Ongoing     Problem: Confusion - Acute:  Goal: Mental status will be restored to baseline  Description  Mental status will be restored to baseline  Outcome: Ongoing     Problem: Injury - Risk of, Physical Injury:  Goal: Will remain free from falls  Description  Will remain free from falls  Outcome: Ongoing  Goal: Absence of physical injury  Description  Absence of physical injury  Outcome: Ongoing     Problem: Mood - Altered:  Goal: Mood stable  Description  Mood stable  Outcome: Ongoing     Problem: Suicide risk  Goal: Provide patient with safe environment  Description  Provide patient with safe environment  Outcome: Ongoing

## 2020-01-15 NOTE — PROGRESS NOTES
Shift assessment completed and documented in doc flow sheet. Pt is alert and oriented to self and place only, resting quietly in bed. VSS, SR in the 60's on monitor. PIV WNL, LR @ 150 ml/hr. Call light is within reach. Bed is in lowest position with side rails up and wheels locked. Sitter at bedside for patient safety. Will monitor.

## 2020-01-15 NOTE — PROGRESS NOTES
AM assessment completed. Scheduled medications given per MAR. VSS on room air. A/O x4 with delayed responses. Denies any needs, call light in reach. Will monitor. Sitter at bedside for suicide precautions.

## 2020-01-15 NOTE — PROGRESS NOTES
Poison control called to receive update on pt's status, updated that plan is for patient to go to inpatient [facility]

## 2020-01-16 PROBLEM — F33.2 SEVERE EPISODE OF RECURRENT MAJOR DEPRESSIVE DISORDER, WITHOUT PSYCHOTIC FEATURES (HCC): Status: ACTIVE | Noted: 2020-01-15

## 2020-01-16 PROCEDURE — 1240000000 HC EMOTIONAL WELLNESS R&B

## 2020-01-16 PROCEDURE — 6370000000 HC RX 637 (ALT 250 FOR IP): Performed by: PSYCHIATRY & NEUROLOGY

## 2020-01-16 PROCEDURE — 99223 1ST HOSP IP/OBS HIGH 75: CPT | Performed by: PSYCHIATRY & NEUROLOGY

## 2020-01-16 PROCEDURE — 6370000000 HC RX 637 (ALT 250 FOR IP): Performed by: PHYSICIAN ASSISTANT

## 2020-01-16 RX ORDER — MEDROXYPROGESTERONE ACETATE 2.5 MG/1
1.25 TABLET ORAL DAILY
Status: DISCONTINUED | OUTPATIENT
Start: 2020-01-16 | End: 2020-01-17

## 2020-01-16 RX ORDER — ESCITALOPRAM OXALATE 10 MG/1
5 TABLET ORAL DAILY
Status: DISCONTINUED | OUTPATIENT
Start: 2020-01-16 | End: 2020-01-17

## 2020-01-16 RX ORDER — LORAZEPAM 0.5 MG/1
0.5 TABLET ORAL EVERY 6 HOURS PRN
Status: DISCONTINUED | OUTPATIENT
Start: 2020-01-16 | End: 2020-01-17 | Stop reason: HOSPADM

## 2020-01-16 RX ADMIN — NICOTINE POLACRILEX 2 MG: 2 GUM, CHEWING BUCCAL at 21:18

## 2020-01-16 RX ADMIN — ESCITALOPRAM OXALATE 5 MG: 10 TABLET ORAL at 11:06

## 2020-01-16 RX ADMIN — FLUTICASONE PROPIONATE 1 SPRAY: 50 SPRAY, METERED NASAL at 08:41

## 2020-01-16 RX ADMIN — NICOTINE POLACRILEX 2 MG: 2 GUM, CHEWING BUCCAL at 08:43

## 2020-01-16 RX ADMIN — PANTOPRAZOLE SODIUM 40 MG: 40 TABLET, DELAYED RELEASE ORAL at 06:21

## 2020-01-16 RX ADMIN — NICOTINE POLACRILEX 2 MG: 2 GUM, CHEWING BUCCAL at 11:07

## 2020-01-16 RX ADMIN — MONTELUKAST SODIUM 10 MG: 10 TABLET, COATED ORAL at 20:21

## 2020-01-16 RX ADMIN — NICOTINE POLACRILEX 2 MG: 2 GUM, CHEWING BUCCAL at 18:59

## 2020-01-16 ASSESSMENT — PATIENT HEALTH QUESTIONNAIRE - PHQ9: SUM OF ALL RESPONSES TO PHQ QUESTIONS 1-9: 16

## 2020-01-16 ASSESSMENT — SLEEP AND FATIGUE QUESTIONNAIRES
SLEEP PATTERN: DISTURBED/INTERRUPTED SLEEP
RESTFUL SLEEP: NO
DIFFICULTY FALLING ASLEEP: NO
DIFFICULTY STAYING ASLEEP: YES
DO YOU HAVE DIFFICULTY SLEEPING: YES
AVERAGE NUMBER OF SLEEP HOURS: 4
DO YOU USE A SLEEP AID: YES
DIFFICULTY ARISING: NO

## 2020-01-16 ASSESSMENT — LIFESTYLE VARIABLES: HISTORY_ALCOHOL_USE: NO
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
## 2020-01-16 NOTE — GROUP NOTE
Group Therapy Note    Date: 1/16/2020    Group Start Time: 1000  Group End Time: 0884  [facility] [address] [facility] [facility]        Group Therapy Note    Attendees: 16         Patient's Goal:  Patient will complete worksheet on Unresolved Dependency and will discuss how symptoms effect mood and mental health. Notes:  Patient attended group. Completed the worksheet and discussed in group. Appeared to understand the concept of owning responsibility for her feelings and gave several examples. Received feedback from peers. Status After Intervention:  Improved    Participation Level:  Active Listener and Interactive    Participation Quality: Appropriate and Attentive    Speech:  normal    Thought Process/Content: Logical    Affective Functioning: Congruent    Mood: anxious, depressed     Level of consciousness:  Oriented x4    Response to Learning: Able to verbalize current knowledge/experience and Able to verbalize/acknowledge new learning    Endings: None Reported    Modes of Intervention: Education, Support, Socialization and Exploration    Discipline Responsible: /Counselor    Signature:  Nando Roman, [facility]

## 2020-01-17 VITALS
WEIGHT: 159 LBS | BODY MASS INDEX: 22.76 KG/M2 | SYSTOLIC BLOOD PRESSURE: 152 MMHG | HEART RATE: 74 BPM | RESPIRATION RATE: 18 BRPM | TEMPERATURE: 98.1 F | DIASTOLIC BLOOD PRESSURE: 72 MMHG | HEIGHT: 70 IN

## 2020-01-17 PROCEDURE — 6370000000 HC RX 637 (ALT 250 FOR IP): Performed by: PSYCHIATRY & NEUROLOGY

## 2020-01-17 PROCEDURE — 5130000000 HC BRIDGE APPOINTMENT

## 2020-01-17 PROCEDURE — 6370000000 HC RX 637 (ALT 250 FOR IP): Performed by: PHYSICIAN ASSISTANT

## 2020-01-17 PROCEDURE — 99239 HOSP IP/OBS DSCHRG MGMT >30: CPT | Performed by: PSYCHIATRY & NEUROLOGY

## 2020-01-17 RX ORDER — ESCITALOPRAM OXALATE 10 MG/1
10 TABLET ORAL DAILY
Qty: 30 TABLET | Refills: 0 | Status: SHIPPED | OUTPATIENT
Start: 2020-01-18

## 2020-01-17 RX ORDER — ESCITALOPRAM OXALATE 10 MG/1
5 TABLET ORAL ONCE
Status: COMPLETED | OUTPATIENT
Start: 2020-01-17 | End: 2020-01-17

## 2020-01-17 RX ORDER — ESCITALOPRAM OXALATE 10 MG/1
10 TABLET ORAL DAILY
Status: DISCONTINUED | OUTPATIENT
Start: 2020-01-18 | End: 2020-01-17 | Stop reason: HOSPADM

## 2020-01-17 RX ADMIN — PANTOPRAZOLE SODIUM 40 MG: 40 TABLET, DELAYED RELEASE ORAL at 07:04

## 2020-01-17 RX ADMIN — FLUTICASONE PROPIONATE 1 SPRAY: 50 SPRAY, METERED NASAL at 09:11

## 2020-01-17 RX ADMIN — ESCITALOPRAM OXALATE 5 MG: 10 TABLET ORAL at 09:12

## 2020-01-17 RX ADMIN — NICOTINE POLACRILEX 2 MG: 2 GUM, CHEWING BUCCAL at 09:16

## 2020-01-17 RX ADMIN — NICOTINE POLACRILEX 2 MG: 2 GUM, CHEWING BUCCAL at 13:01

## 2020-01-17 RX ADMIN — ESCITALOPRAM OXALATE 5 MG: 10 TABLET ORAL at 10:42

## 2020-01-17 RX ADMIN — LORAZEPAM 0.5 MG: 0.5 TABLET ORAL at 08:03

## 2020-01-17 NOTE — H&P
cannabis. Acetaminophen and salicylate levels below threshold. CBC within normal  limits. UA clear. FORMULATION:  This is a domiciled, , employed, 40-year-old with  a history of mood and anxiety symptoms, who was brought in by emergency  services after an intentional overdose on Benadryl. She was admitted to  Medicine, stabilized, and transferred to us for further observation and  treatment. She meets criteria for major depressive disorder, severe,  without psychotic features and panic disorder without agoraphobia. She  requires further inpatient stabilization and treatment. PLAN:  1. Admit to Inpatient Psychiatry for further evaluation and treatment. 2.  Start Lexapro 5 mg p.o. daily for treatment of anxiety and  depression. Ordered every 15-minute checks for safety, programming, and  p.r.n. medication for anxiety, agitation, and insomnia. 3.  Internal Medicine consult for admission. 4.  Collateral information from mother if possible. 5.  Estimated length of stay, five to eight days. The patient is  voluntary. A total of 70 minutes were spent with the patient in completing this  evaluation, and more than 50% of the time was spent in completing this  evaluation, providing counseling, and planning treatment with the  patient.       Nathalie Limon MD    D: 01/16/2020 15:09:31       T: 01/16/2020 17:09:06     [id number]  Job#: [id number]     Doc#: [id number]    CC:
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
